# Patient Record
Sex: FEMALE | Race: WHITE | URBAN - METROPOLITAN AREA
[De-identification: names, ages, dates, MRNs, and addresses within clinical notes are randomized per-mention and may not be internally consistent; named-entity substitution may affect disease eponyms.]

---

## 2021-12-02 ENCOUNTER — OFFICE VISIT (OUTPATIENT)
Dept: URBAN - METROPOLITAN AREA CLINIC 19 | Facility: CLINIC | Age: 31
End: 2021-12-02

## 2021-12-03 ENCOUNTER — TELEPHONE ENCOUNTER (OUTPATIENT)
Dept: URBAN - METROPOLITAN AREA CLINIC 19 | Facility: CLINIC | Age: 31
End: 2021-12-03

## 2021-12-03 ENCOUNTER — DASHBOARD ENCOUNTERS (OUTPATIENT)
Age: 31
End: 2021-12-03

## 2021-12-03 ENCOUNTER — OFFICE VISIT (OUTPATIENT)
Dept: URBAN - METROPOLITAN AREA CLINIC 19 | Facility: CLINIC | Age: 31
End: 2021-12-03
Payer: COMMERCIAL

## 2021-12-03 ENCOUNTER — LAB OUTSIDE AN ENCOUNTER (OUTPATIENT)
Dept: URBAN - METROPOLITAN AREA CLINIC 19 | Facility: CLINIC | Age: 31
End: 2021-12-03

## 2021-12-03 ENCOUNTER — WEB ENCOUNTER (OUTPATIENT)
Dept: URBAN - METROPOLITAN AREA CLINIC 19 | Facility: CLINIC | Age: 31
End: 2021-12-03

## 2021-12-03 VITALS
BODY MASS INDEX: 24.1 KG/M2 | HEART RATE: 78 BPM | WEIGHT: 136 LBS | SYSTOLIC BLOOD PRESSURE: 126 MMHG | HEIGHT: 63 IN | TEMPERATURE: 98.4 F | DIASTOLIC BLOOD PRESSURE: 74 MMHG

## 2021-12-03 DIAGNOSIS — R11.2 NAUSEA AND VOMITING, UNSPECIFIED VOMITING TYPE: ICD-10-CM

## 2021-12-03 DIAGNOSIS — R10.31 RIGHT LOWER QUADRANT PAIN: ICD-10-CM

## 2021-12-03 PROCEDURE — 99204 OFFICE O/P NEW MOD 45 MIN: CPT | Performed by: NURSE PRACTITIONER

## 2021-12-03 RX ORDER — ESTRADIOL 1 MG/1
1 TABLET TABLET ORAL ONCE A DAY
Status: ACTIVE | COMMUNITY

## 2021-12-03 NOTE — HPI-TODAY'S VISIT:
Ms. Iqbal is a 31-year-old female who presents today for right lower quadrant pain and nausea and vomiting.  She presented to [facility] ED on 11/29/2021 for the symptoms.  The symptoms started 2 hours after eating a chicken sandwich.  She also noted a fever of 100.7.  Stated her children had been having similar GI symptoms.  Labs-WBC 5.0, Hgb 11.6, HCT 34.9, MCV 87.0, LFTs normal, lipase 30, HCG negative.  CT ab/pelvis-normal appendix.  No acute abnormality identified.  Moderate amount of stool throughout the right greater than left colon.  Today she reports she is still feeling the same she did when she went to the ER except she has not had a fever since.  She reports that Zofran makes her drowsy and she has 3 children.  She also reports she has not tried Bentyl.  She reports she is eating makes her nauseous.  She reports she is unable to eat or drink and she feels very dehydrated and dizzy.  She reports she saw some blood in the bowel movement a couple of days ago and then had diarrhea yesterday.  She reports she had a total hysterectomy with the last part of that surgery being about a year ago.  She reports she is lost 3 pounds since she has been sick.  She reports she is unable to eat any Jell-O.  She has never had an EGD or colonoscopy.  She denies cardiac/kidney disease, diabetes, blood thinners, and home O2.

## 2022-02-01 PROBLEM — 301754002: Status: ACTIVE | Noted: 2021-12-03

## 2022-02-01 PROBLEM — 16932000: Status: ACTIVE | Noted: 2021-12-03

## 2022-02-02 ENCOUNTER — TELEPHONE ENCOUNTER (OUTPATIENT)
Dept: URBAN - METROPOLITAN AREA CLINIC 19 | Facility: CLINIC | Age: 32
End: 2022-02-02

## 2022-02-04 ENCOUNTER — OFFICE VISIT (OUTPATIENT)
Dept: URBAN - METROPOLITAN AREA SURGERY CENTER 31 | Facility: SURGERY CENTER | Age: 32
End: 2022-02-04
Payer: COMMERCIAL

## 2022-02-04 DIAGNOSIS — R10.84 ABDOMINAL CRAMPING, GENERALIZED: ICD-10-CM

## 2022-02-04 PROCEDURE — G8907 PT DOC NO EVENTS ON DISCHARG: HCPCS | Performed by: INTERNAL MEDICINE

## 2022-02-04 PROCEDURE — 45378 DIAGNOSTIC COLONOSCOPY: CPT | Performed by: INTERNAL MEDICINE
